# Patient Record
Sex: FEMALE | Race: WHITE | Employment: FULL TIME | URBAN - METROPOLITAN AREA
[De-identification: names, ages, dates, MRNs, and addresses within clinical notes are randomized per-mention and may not be internally consistent; named-entity substitution may affect disease eponyms.]

---

## 2017-11-22 PROBLEM — L40.9 SCALP PSORIASIS: Status: ACTIVE | Noted: 2017-11-22

## 2017-11-22 PROCEDURE — 88175 CYTOPATH C/V AUTO FLUID REDO: CPT | Performed by: FAMILY MEDICINE

## 2020-09-22 PROBLEM — F33.40 RECURRENT MAJOR DEPRESSIVE DISORDER, IN REMISSION (HCC): Status: ACTIVE | Noted: 2020-09-22

## 2020-09-22 PROBLEM — F33.40 RECURRENT MAJOR DEPRESSIVE DISORDER, IN REMISSION: Status: ACTIVE | Noted: 2020-09-22

## 2025-04-22 ENCOUNTER — ORDER TRANSCRIPTION (OUTPATIENT)
Dept: ADMINISTRATIVE | Facility: HOSPITAL | Age: 55
End: 2025-04-22

## 2025-04-22 DIAGNOSIS — R22.1 NECK MASS: Primary | ICD-10-CM

## 2025-05-12 NOTE — DISCHARGE INSTRUCTIONS
Discharge/After Visit [facility] Department of Radiology  Biopsy of Lymph Node     Activity  Take it easy for the rest of the day after your biopsy. You may be sore at the site of the biopsy for the next 5 days. Do not do any strenuous exercises or lift over 5 lbs. for 24 hours after the procedure.     Biopsy Site  Keep the bandage on the biopsy site for the next hour. No shower/bathing restrictions.    Pain Management  You may use over-the-counter or prescribed pain relief medication if not contraindicated due to a medical condition.   You may use a covered ice pack to the procedure site for 20 min, as needed, for pain.   The site may be red or tender for a few days.  You may develop a sore throat after the procedure. If necessary, throat lozenges may be used to relieve the discomfort.     Medications  You may resume your usual home medications, including blood thinners (24 hours after the procedure) if you experience no bleeding or hematoma at the puncture site.     When to seek medical advice  Call the provider that ordered the biopsy with questions and to discuss test results. They may also be available on your [facility] My Chart. You may also call the Radiology nurse at [phone], M-F, 8-5 with any additional questions or concerns.    Dial [phone] and ask the  to page the on-call Radiologist right away if any of these occur:  There is a change in color or temperature of the area where the biopsy was performed.  You develop increasing pain, increased swelling in your neck, difficulty breathing or swallowing.    IF YOU FEEL YOU ARE HAVING AN EMERGENCY,   CALL 911 OR GO TO THE NEAREST EMERGENCY ROOM      4.2.24 MO/  Radiology

## 2025-05-13 ENCOUNTER — HOSPITAL ENCOUNTER (OUTPATIENT)
Dept: ULTRASOUND IMAGING | Facility: HOSPITAL | Age: 55
Discharge: HOME | End: 2025-05-13
Attending: OTOLARYNGOLOGY
Payer: COMMERCIAL

## 2025-05-13 ENCOUNTER — APPOINTMENT (OUTPATIENT)
Dept: LAB | Facility: HOSPITAL | Age: 55
End: 2025-05-13
Attending: OTOLARYNGOLOGY
Payer: COMMERCIAL

## 2025-05-13 DIAGNOSIS — R22.1 NECK MASS: ICD-10-CM

## 2025-05-13 PROCEDURE — 87116 MYCOBACTERIA CULTURE: CPT | Performed by: OTOLARYNGOLOGY

## 2025-05-13 PROCEDURE — 87176 TISSUE HOMOGENIZATION CULTR: CPT | Performed by: OTOLARYNGOLOGY

## 2025-05-13 PROCEDURE — 88342 IMHCHEM/IMCYTCHM 1ST ANTB: CPT | Performed by: OTOLARYNGOLOGY

## 2025-05-13 PROCEDURE — 88305 TISSUE EXAM BY PATHOLOGIST: CPT | Performed by: OTOLARYNGOLOGY

## 2025-05-13 PROCEDURE — 88312 SPECIAL STAINS GROUP 1: CPT | Performed by: OTOLARYNGOLOGY

## 2025-05-13 PROCEDURE — 87206 SMEAR FLUORESCENT/ACID STAI: CPT | Performed by: OTOLARYNGOLOGY

## 2025-05-13 PROCEDURE — 87070 CULTURE OTHR SPECIMN AEROBIC: CPT | Performed by: OTOLARYNGOLOGY

## 2025-05-13 PROCEDURE — 87077 CULTURE AEROBIC IDENTIFY: CPT | Performed by: OTOLARYNGOLOGY

## 2025-05-13 PROCEDURE — 87075 CULTR BACTERIA EXCEPT BLOOD: CPT | Performed by: OTOLARYNGOLOGY

## 2025-05-13 PROCEDURE — 88341 IMHCHEM/IMCYTCHM EA ADD ANTB: CPT | Performed by: OTOLARYNGOLOGY

## 2025-05-13 PROCEDURE — 88173 CYTOPATH EVAL FNA REPORT: CPT | Performed by: OTOLARYNGOLOGY

## 2025-05-13 PROCEDURE — 10005 FNA BX W/US GDN 1ST LES: CPT | Performed by: OTOLARYNGOLOGY

## 2025-05-13 PROCEDURE — 87205 SMEAR GRAM STAIN: CPT | Performed by: OTOLARYNGOLOGY

## 2025-05-13 PROCEDURE — 87102 FUNGUS ISOLATION CULTURE: CPT | Performed by: OTOLARYNGOLOGY

## 2025-05-13 NOTE — H&P
[facility]   part of [facility]   History & Physical    Maddie Burgess Patient Status:  Outpatient    1970 MRN [id number]   Location [facility] ULTRASOUND Attending Luz Loyd MD   Hosp Day # 0 PCP Tamara Robertson MD     Admitting Diagnosis:   Right neck mass    History of Present Illness:   Here for right neck mass biopsy. No complaints    History   Past Medical History:  Past Medical History[1]    Past Surgical History:  Past Surgical History[2]    Social History:  Social History     Tobacco Use    Smoking status: Never    Smokeless tobacco: Never   Substance Use Topics    Alcohol use: Yes     Comment: glass of wine nightly, 3-4 a week        Family History:  Family History[3]    Allergies/Medications:   Allergies:  Allergies[4]    Medications:  Medications - Current[5]    Physical Exam & Review of Systems:   Physical Exam:    There were no vitals taken for this visit.    General: NAD  Lungs: Non-labored  Heart: Regular rate  Abdomen: Non-distended      Results:   Labs:  No results for input(s): \"RBC\", \"HGB\", \"HCT\", \"MCV\", \"MCH\", \"MCHC\", \"RDW\", \"NEPRELIM\", \"WBC\", \"PLT\" in the last 168 hours.  No results for input(s): \"PTP\", \"INR\", \"PTT\" in the last 168 hours.  No results for input(s): \"GLU\", \"BUN\", \"CREATSERUM\", \"GFRAA\", \"GFRNAA\", \"CA\", \"NA\", \"K\", \"CL\", \"CO2\" in the last 168 hours.    Assessment/Plan:   Impression: Right neck mass    Recommendations: Proceed with US guided right neck mass biopsy, with pathology present.    Angelica Rich MD  2025  10:18 AM           [1] No past medical history on file.  [2]   Past Surgical History:  Procedure Laterality Date    Cholecystectomy      D & c          Laparoscopic  08    Performed by SHIRIN SHIELDS at [facility]    Other surgical history      left arm surgery    Repair epigastric hernia,reduc  08    Performed by SHIRIN SHIELDS at [facility]   [3]   Family History  Problem Relation Age  of Onset    Cancer Maternal Grandmother     Hypertension Brother     Cancer Brother         kidney cancer    Other (Other) Father          in car accident    Other (alcoholic) Father         alcoholic    Hypertension Mother     Thyroid disease Mother     Hypertension Brother     Other (Other) Brother         coffin-isaac    Psychiatric Brother         depression    Breast Cancer Paternal Grandmother 75        age 70s   [4] No Known Allergies  [5]   Current Outpatient Medications:     ESCITALOPRAM 10 MG Oral Tab, TAKE 1 TABLET BY MOUTH EVERY DAY, Disp: 90 tablet, Rfl: 2    Fluocinolone Acetonide 0.01 % External Solution, Apply 1 Application topically 2 (two) times daily. (Patient not taking: Reported on 3/18/2022), Disp: 3 each, Rfl: 0

## 2025-05-13 NOTE — PROCEDURES
[facility]   part of [facility]  Procedure Note    Maddie Burgess Patient Status:  Outpatient    1970 MRN [id number]   Location [facility] ULTRASOUND Attending Luz Loyd MD   Hosp Day # 0 PCP Tamara Robertson MD     Procedure: US guided right neck mass FNA and cores biopsy    Pre-Procedure Diagnosis:  Right neck mass    Post-Procedure Diagnosis: Same    Anesthesia:  Local    Findings:    Successful FNA and core biopsy of right neck mass with real time review with pathologist.    Specimens: Cores, cytology, cultures    Blood Loss:  <5ml    Tourniquet Time: 0  Complications:  None  Drains:  None    Secondary Diagnosis:  None    Angelica Rich MD  2025